# Patient Record
Sex: MALE | Race: BLACK OR AFRICAN AMERICAN
[De-identification: names, ages, dates, MRNs, and addresses within clinical notes are randomized per-mention and may not be internally consistent; named-entity substitution may affect disease eponyms.]

---

## 2019-03-30 ENCOUNTER — HOSPITAL ENCOUNTER (EMERGENCY)
Dept: HOSPITAL 72 - EMR | Age: 64
LOS: 1 days | Discharge: ANOTHER HOSPITAL | End: 2019-03-31
Payer: SELF-PAY

## 2019-03-30 VITALS — DIASTOLIC BLOOD PRESSURE: 80 MMHG | SYSTOLIC BLOOD PRESSURE: 160 MMHG

## 2019-03-30 VITALS — HEIGHT: 72 IN | BODY MASS INDEX: 23.03 KG/M2 | WEIGHT: 170 LBS
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
## 2019-03-30 DIAGNOSIS — F15.10: ICD-10-CM

## 2019-03-30 DIAGNOSIS — S06.5X9A: Primary | ICD-10-CM

## 2019-03-30 LAB
ADD MANUAL DIFF: NO
ALBUMIN SERPL-MCNC: 3.9 G/DL (ref 3.4–5)
ALBUMIN/GLOB SERPL: 0.8 {RATIO} (ref 1–2.7)
ALP SERPL-CCNC: 71 U/L (ref 46–116)
ALT SERPL-CCNC: 90 U/L (ref 12–78)
ANION GAP SERPL CALC-SCNC: 10 MMOL/L (ref 5–15)
AST SERPL-CCNC: 151 U/L (ref 15–37)
BASOPHILS NFR BLD AUTO: 2 % (ref 0–2)
BILIRUB SERPL-MCNC: 0.9 MG/DL (ref 0.2–1)
BUN SERPL-MCNC: 31 MG/DL (ref 7–18)
CALCIUM SERPL-MCNC: 9.7 MG/DL (ref 8.5–10.1)
CHLORIDE SERPL-SCNC: 102 MMOL/L (ref 98–107)
CO2 SERPL-SCNC: 29 MMOL/L (ref 21–32)
CREAT SERPL-MCNC: 1.7 MG/DL (ref 0.55–1.3)
EOSINOPHIL NFR BLD AUTO: 0.1 % (ref 0–3)
ERYTHROCYTE [DISTWIDTH] IN BLOOD BY AUTOMATED COUNT: 13.7 % (ref 11.6–14.8)
GLOBULIN SER-MCNC: 4.7 G/DL
HCT VFR BLD CALC: 50.9 % (ref 42–52)
HGB BLD-MCNC: 16.9 G/DL (ref 14.2–18)
LYMPHOCYTES NFR BLD AUTO: 18.9 % (ref 20–45)
MCV RBC AUTO: 87 FL (ref 80–99)
MONOCYTES NFR BLD AUTO: 6.9 % (ref 1–10)
NEUTROPHILS NFR BLD AUTO: 72.1 % (ref 45–75)
PLATELET # BLD: 159 K/UL (ref 150–450)
POTASSIUM SERPL-SCNC: 4.5 MMOL/L (ref 3.5–5.1)
RBC # BLD AUTO: 5.88 M/UL (ref 4.7–6.1)
SODIUM SERPL-SCNC: 141 MMOL/L (ref 136–145)
WBC # BLD AUTO: 9.4 K/UL (ref 4.8–10.8)

## 2019-03-30 PROCEDURE — 80329 ANALGESICS NON-OPIOID 1 OR 2: CPT

## 2019-03-30 PROCEDURE — 80053 COMPREHEN METABOLIC PANEL: CPT

## 2019-03-30 PROCEDURE — 85730 THROMBOPLASTIN TIME PARTIAL: CPT

## 2019-03-30 PROCEDURE — 96374 THER/PROPH/DIAG INJ IV PUSH: CPT

## 2019-03-30 PROCEDURE — 86901 BLOOD TYPING SEROLOGIC RH(D): CPT

## 2019-03-30 PROCEDURE — 70450 CT HEAD/BRAIN W/O DYE: CPT

## 2019-03-30 PROCEDURE — 86900 BLOOD TYPING SEROLOGIC ABO: CPT

## 2019-03-30 PROCEDURE — 93005 ELECTROCARDIOGRAM TRACING: CPT

## 2019-03-30 PROCEDURE — 99291 CRITICAL CARE FIRST HOUR: CPT

## 2019-03-30 PROCEDURE — 96375 TX/PRO/DX INJ NEW DRUG ADDON: CPT

## 2019-03-30 PROCEDURE — 96361 HYDRATE IV INFUSION ADD-ON: CPT

## 2019-03-30 PROCEDURE — 80307 DRUG TEST PRSMV CHEM ANLYZR: CPT

## 2019-03-30 PROCEDURE — 85610 PROTHROMBIN TIME: CPT

## 2019-03-30 PROCEDURE — 85025 COMPLETE CBC W/AUTO DIFF WBC: CPT

## 2019-03-30 PROCEDURE — 86850 RBC ANTIBODY SCREEN: CPT

## 2019-03-30 PROCEDURE — 36415 COLL VENOUS BLD VENIPUNCTURE: CPT

## 2019-03-30 NOTE — DIAGNOSTIC IMAGING REPORT
EXAM:

  CT Head Without Intravenous Contrast

 

CLINICAL HISTORY:

  AMS

 

TECHNIQUE:

  Axial computed tomography images of the head/brain without intravenous 

contrast.  CTDI is 0.15, 70.38 mGy and DLP is 1404 mGy-cm.  One or more 

of the following dose reduction techniques were used: automated exposure 

control, adjustment of the mA and/or kV according to patient size, use of 

iterative reconstruction technique.

 

COMPARISON:

  CT head dated 3/30/2019

 

FINDINGS:

  Brain:  Small subacute subdural hematoma along the left convexity 

measuring up to 7 mm.  No acute infarct.  Chronic small vessel ischemic 

disease and senescent changes.

  Midline shift:  No midline shift.

  Ventricles:  Unremarkable.  No ventriculomegaly.

  Bones/joints:  Post surgical changes within the left calvarium.  No 

acute fracture.

  Soft tissues:  Unremarkable.

  Sinuses:  Unremarkable as visualized.  No acute sinusitis.

  Mastoid air cells:  Unremarkable as visualized.  No mastoid effusion.

 

IMPRESSION:     

  Small subacute subdural hematoma along the left convexity measuring up 

to 7 mm. No midline shift.

 

<MYCVCSECTION>

 

[employer]

 

03/30/19 23:59 Verify Receipt Verified receipt with Chuck ESCAMILLA on 03/30 

23:58 (-07:00)

## 2019-03-30 NOTE — NUR
ED Nurse Note:





pt brought in by [employer] coming from [employer] station, c/c ETOH, pt unable to state 
how much he drank today, noted slurred speech. pt AA&ox1, gcs=15, skin warm and 
dry, resp even and unlabored on RA, -n/v/d at this time, ambulates w/ steady 
gait, will cont monitor.

## 2019-03-31 VITALS — SYSTOLIC BLOOD PRESSURE: 183 MMHG | DIASTOLIC BLOOD PRESSURE: 94 MMHG

## 2019-03-31 VITALS — SYSTOLIC BLOOD PRESSURE: 123 MMHG | DIASTOLIC BLOOD PRESSURE: 80 MMHG

## 2019-03-31 LAB
APTT BLD: 25 SEC (ref 23–33)
INR PPP: 1.1 (ref 0.9–1.1)

## 2019-03-31 NOTE — NUR
ED Nurse Note:



PT LEFT WITH LIFE LINE WITH ALL BELONGINGS. PT VSS, PT SKIN INTACT. PT IS AOX0, 
PT IS ON ROOM AIR. PT SHOWS NO SIGNS OF ACUTE DISTRESS AT THE MOMENT

## 2019-03-31 NOTE — NUR
ED Nurse Note:



telephone report givent to britany cordova from [facility]. lifeline at 
bedside. pt is asleep at the moment, vss, no acute distress, skin intact.

## 2019-03-31 NOTE — EMERGENCY ROOM REPORT
History of Present Illness


General


Chief Complaint:  Alcohol Intoxication


Source:  Patient, EMS





Present Illness


HPI


63-year-old male presents ED for evaluation.  Brought in by EMS.  Reportedly 

found on the ground tonight.  EMS suspects alcohol use.  Patient lethargic but 

answering to his name.  Denies taking alcohol.  Denies drug use.  Denies any 

headache.  Denies chest pain or shortness of breath.  No other aggravating 

relieving factors.  Denies any other associated symptoms


Allergies:  


Coded Allergies:  


     No Known Allergies (Unverified , 3/30/19)





Patient History


Past Medical History:  none


Past Surgical History:  none


Pertinent Family History:  none


Social History:  Reports: alcohol use, drug use; Denies: smoking


Immunizations:  UTD


Reviewed Nursing Documentation:  PMH: Agreed; PSxH: Agreed





Nursing Documentation-PMH


Past Medical History:  No Stated History





Review of Systems


All Other Systems:  limited





Physical Exam





Vital Signs








  Date Time  Temp Pulse Resp B/P (MAP) Pulse Ox O2 Delivery O2 Flow Rate FiO2


 


3/30/19 22:28 98.4 80 12 160/80 100 Room Air  








Sp02 EP Interpretation:  reviewed, normal


General Appearance:  other - lethargic


Head:  normocephalic


Eyes:  bilateral eye normal inspection, bilateral eye PERRL


ENT:  normal ENT inspection


Neck:  normal inspection


Respiratory:  chest non-tender, lungs clear, normal breath sounds, speaking 

full sentences


Cardiovascular #1:  regular rate, rhythm, no edema


Gastrointestinal:  normal bowel sounds, non tender, soft, non-distended, no 

guarding, no rebound


Rectal:  deferred


Genitourinary:  no CVA tenderness


Musculoskeletal:  normal inspection


Neurologic:  other - lethargic


Psychiatric:  other - lethargic


Skin:  normal inspection


Lymphatic:  normal inspection





Procedures


Critical Care Time


Critical Care Time


i.  I feel this is a highly complex case requiring extensive working including 

EKG/Rhythm strip, Xray/CT/US, Blood/urine lab work, repeat exams while in ED, 


and administration of strong opiates/narcotics for pain control, admission to 

hospital or close patient follow up.  





Total time: 35 min bedside evaluation and treatment excludes procedures (EKG). 


Reason for critical care: AMS, subdural hematoma


Possible complications: hypotension, hypertension, MI, shock, arrhythmias, 

metabolic acidosis, end organ damage, respiratory failure. 


Interventions: labs, IVFS, CT head, EJ placement, Keppra, consutlation with 

neurosurgery at [facility]


Course: Patient presenting with altered mental status.  Found down.  Suspected 

alcohol.  Alcohol level normal.  U tox positive for amphetamines.  CT shows 

subdural with no mass effect or midline shift.  Patient protecting airway.  

Given IV Keppra.  Peripheral EJ line placed.  Consultation with neurosurgery at 

[facility] and he accepted patient


Consultations: nursing staff, EMS, family 


Performed by: Dr Woods


Tolerated well condition = critical





j.  because of unstable vital signs this patient had a condition that could 

potentially threaten life or limb.  I feel this is a critical patient who 

required my full attention while patient was considered critical.  Total 

Critical Care Time excluding procedures was greater than 35 minutes





Medical Decision Making


Diagnostic Impression:  


 Primary Impression:  


 Subdural hematoma


 Additional Impression:  


 Amphetamine abuse


ER Course


Hospital Course 





64 yo M presents with AMS. found down





Differential diagnosis includes- breakthrough seizure, alcohol abuse, 

noncompliance with medication





Clinical course


Patient placed on stretcher.  Initial history and physical I ordered labs, IV 

fluids, CT brain





Labs- BUN/Cr elevated, no leukocytosis, hb/hct stable, ETOH normal, UTox + 

amphetamines 





CT Brain subdural hematoma no midline shift





Patient had difficult IV access.  I placed peripheral EJ line





Given loading dose of Keppra. patient is protecting airway and therefore should 

not be intubated.  Patient will be transferred at [facility] for higher level 

of care.  Case endorsed to neurosurgeon





i.  I feel this is a highly complex case requiring extensive working including 

EKG/Rhythm strip, Xray/CT/US, Blood/urine lab work, repeat exams while in ED, 

and administration of strong opiates/narcotics for pain control, admission to 

hospital or close patient follow up.  





Diagnosis - subdural hematoma, amphetamine abse 





transferred in critical condition





Labs








Test


  3/30/19


23:04


 


White Blood Count


  9.4 K/UL


(4.8-10.8)


 


Red Blood Count


  5.88 M/UL


(4.70-6.10)


 


Hemoglobin


  16.9 G/DL


(14.2-18.0)


 


Hematocrit


  50.9 %


(42.0-52.0)


 


Mean Corpuscular Volume 87 FL (80-99) 


 


Mean Corpuscular Hemoglobin


  28.6 PG


(27.0-31.0)


 


Mean Corpuscular Hemoglobin


Concent 33.1 G/DL


(32.0-36.0)


 


Red Cell Distribution Width


  13.7 %


(11.6-14.8)


 


Platelet Count


  159 K/UL


(150-450)


 


Mean Platelet Volume


  6.6 FL


(6.5-10.1)


 


Neutrophils (%) (Auto)


  72.1 %


(45.0-75.0)


 


Lymphocytes (%) (Auto)


  18.9 %


(20.0-45.0)


 


Monocytes (%) (Auto)


  6.9 %


(1.0-10.0)


 


Eosinophils (%) (Auto)


  0.1 %


(0.0-3.0)


 


Basophils (%) (Auto)


  2.0 %


(0.0-2.0)


 


Prothrombin Time


  11.4 SEC


(9.30-11.50)


 


Prothromb Time International


Ratio 1.1 (0.9-1.1) 


 


 


Activated Partial


Thromboplast Time 25 SEC (23-33) 


 


 


Sodium Level


  141 MMOL/L


(136-145)


 


Potassium Level


  4.5 MMOL/L


(3.5-5.1)


 


Chloride Level


  102 MMOL/L


()


 


Carbon Dioxide Level


  29 MMOL/L


(21-32)


 


Anion Gap


  10 mmol/L


(5-15)


 


Blood Urea Nitrogen


  31 mg/dL


(7-18)


 


Creatinine


  1.7 MG/DL


(0.55-1.30)


 


Estimat Glomerular Filtration


Rate 40.7 mL/min


(>60)


 


Glucose Level


  91 MG/DL


()


 


Calcium Level


  9.7 MG/DL


(8.5-10.1)


 


Total Bilirubin


  0.9 MG/DL


(0.2-1.0)


 


Aspartate Amino Transf


(AST/SGOT) 151 U/L


(15-37)


 


Alanine Aminotransferase


(ALT/SGPT) 90 U/L (12-78) 


 


 


Alkaline Phosphatase


  71 U/L


()


 


Total Protein


  8.6 G/DL


(6.4-8.2)


 


Albumin


  3.9 G/DL


(3.4-5.0)


 


Globulin 4.7 g/dL 


 


Albumin/Globulin Ratio 0.8 (1.0-2.7) 


 


Salicylates Level


  2.5 ug/mL


(2.8-20)


 


Urine Opiates Screen


  Negative


(NEGATIVE)


 


Acetaminophen Level


  < 2 MCG/ML


(10-30)


 


Urine Barbiturates Screen


  Negative


(NEGATIVE)


 


Phencyclidine (PCP) Screen


  Negative


(NEGATIVE)


 


Urine Amphetamines Screen


  Positive


(NEGATIVE)


 


Urine Benzodiazepines Screen


  Negative


(NEGATIVE)


 


Urine Cocaine Screen


  Negative


(NEGATIVE)


 


Urine Marijuana (THC) Screen


  Negative


(NEGATIVE)


 


Serum Alcohol < 3 mg/dL 








CT/MRI/US Diagnostic Results


CT/MRI/US Diagnostic Results :  


   Imaging Test Ordered:  CT A/P


   Impression


Small subacute subdural hematoma along the left convexity measuring up 


to 7 mm. No midline shift.





Last Vital Signs








  Date Time  Temp Pulse Resp B/P (MAP) Pulse Ox O2 Delivery O2 Flow Rate FiO2


 


3/31/19 00:49 98.2 82 21 183/94 100 Room Air  








Status:  improved


Disposition:  XFER SHT-TRM HOSP


Condition:  Critical


Scripts


No Active Prescriptions or Reported Meds


Referrals:  


NOT CHOSEN IPA/MD,REFERRING (PCP)











Anibal Woods MD Mar 31, 2019 02:52